# Patient Record
Sex: FEMALE | Race: WHITE | Employment: FULL TIME | URBAN - METROPOLITAN AREA
[De-identification: names, ages, dates, MRNs, and addresses within clinical notes are randomized per-mention and may not be internally consistent; named-entity substitution may affect disease eponyms.]

---

## 2020-03-10 PROBLEM — E06.3 HASHIMOTO'S THYROIDITIS: Status: ACTIVE | Noted: 2019-01-31

## 2020-03-10 PROBLEM — E66.01 OBESITY, MORBID (HCC): Status: ACTIVE | Noted: 2020-03-10

## 2022-03-18 ENCOUNTER — HOSPITAL ENCOUNTER (OUTPATIENT)
Dept: ULTRASOUND IMAGING | Age: 37
Discharge: HOME | End: 2022-03-18
Attending: NURSE PRACTITIONER
Payer: COMMERCIAL

## 2022-03-18 DIAGNOSIS — R22.1 NECK FULLNESS: ICD-10-CM

## 2022-03-18 PROCEDURE — 76536 US EXAM OF HEAD AND NECK: CPT

## 2022-03-21 NOTE — PROGRESS NOTES
Patient states she is still having the issue, and wanted to know how she needed to follow up. Patient states the ultrasound tech did go over the spot on her throat she was having issues with.